# Patient Record
Sex: FEMALE | HISPANIC OR LATINO | Employment: FULL TIME | URBAN - METROPOLITAN AREA
[De-identification: names, ages, dates, MRNs, and addresses within clinical notes are randomized per-mention and may not be internally consistent; named-entity substitution may affect disease eponyms.]

---

## 2024-01-02 ENCOUNTER — HOSPITAL ENCOUNTER (EMERGENCY)
Facility: CLINIC | Age: 44
Discharge: HOME | End: 2024-01-02
Attending: EMERGENCY MEDICINE | Admitting: EMERGENCY MEDICINE
Payer: COMMERCIAL

## 2024-01-02 VITALS
DIASTOLIC BLOOD PRESSURE: 82 MMHG | SYSTOLIC BLOOD PRESSURE: 125 MMHG | OXYGEN SATURATION: 97 % | RESPIRATION RATE: 16 BRPM | HEART RATE: 108 BPM | TEMPERATURE: 98.9 F

## 2024-01-02 DIAGNOSIS — J10.1 INFLUENZA A: ICD-10-CM

## 2024-01-02 LAB
FLUAV RNA SPEC QL NAA+PROBE: POSITIVE
FLUBV RNA RESP QL NAA+PROBE: NEGATIVE
RSV RNA SPEC NAA+PROBE: NEGATIVE
SARS-COV-2 RNA RESP QL NAA+PROBE: NEGATIVE

## 2024-01-02 PROCEDURE — 87637 SARSCOV2&INF A&B&RSV AMP PRB: CPT | Performed by: EMERGENCY MEDICINE

## 2024-01-02 PROCEDURE — 99283 EMERGENCY DEPT VISIT LOW MDM: CPT

## 2024-01-02 PROCEDURE — 250N000013 HC RX MED GY IP 250 OP 250 PS 637: Performed by: EMERGENCY MEDICINE

## 2024-01-02 RX ORDER — ACETAMINOPHEN 500 MG
1000 TABLET ORAL ONCE
Status: COMPLETED | OUTPATIENT
Start: 2024-01-02 | End: 2024-01-02

## 2024-01-02 RX ADMIN — ACETAMINOPHEN 1000 MG: 500 TABLET, FILM COATED ORAL at 18:10

## 2024-01-02 ASSESSMENT — ACTIVITIES OF DAILY LIVING (ADL): ADLS_ACUITY_SCORE: 35

## 2024-01-02 NOTE — Clinical Note
[place] Meyer was seen and treated in our emergency department on 1/2/2024.  She may return to work on 01/08/2024.  Ms. Terry Meyer was seen in our emergency department.  She has been diagnosed with influenza, highly contagious virus.  I recommend she avoid work for the next 4 to 5 days until her fevers juan and she feels well enough to perform her work duties.     If you have any questions or concerns, please don't hesitate to call.      Trierweiler, Chad A, MD

## 2024-01-03 NOTE — ED NOTES
PIT/Triage Evaluation    Patient presented with concern of a cough and fever and bodyaches that started yesterday.  Last took 600 mg of ibuprofen at 1 PM today.    Exam is notable for:  Patient Vitals for the past 24 hrs:   BP Temp Temp src Pulse Resp SpO2   01/02/24 1803 (!) 153/93 (!) 101.3  F (38.5  C) Temporal (!) 124 16 98 %     General:  Sitting on bed, comfortable appearing.   HENT:  No obvious trauma to head  Right Ear:  External ear normal.   Left Ear:  External ear normal.   Nose:  Nose normal.   Eyes:  Conjunctivae and EOM are normal.  Neck: Normal range of motion. Neck supple. No tracheal deviation present.   Pulm/Chest: No respiratory distress  M/S: Normal range of motion.   Neuro: Alert. GCS 15.  Skin: Skin is warm and dry. No rash noted. Not diaphoretic.   Psych: Normal mood and affect. Behavior is normal.     Appropriate interventions for symptom management were initiated if applicable.  Appropriate diagnostic tests were initiated if indicated.    Important information for subsequent clinician:  Florida Meyer is a very pleasant 43 year old year old patient who presents to the emergency department with concern of a cough, myalgias and fever.  Influenza swab sent.  Tylenol provided along with a tall glass of water given the tachycardia    I briefly evaluated the patient and developed an initial plan of care. I discussed this plan and explained that this brief interaction does not constitute a full evaluation. Patient/family understands that they should wait to be fully evaluated and discuss any test results with another clinician prior to leaving the hospital.       Rod Greenwood,   01/02/24 [id number]

## 2024-01-03 NOTE — ED TRIAGE NOTES
PT STARTED TO FEEL FEVERISH SINCE LAST NIGHT , HAS TAKEN 927 TYLENOL AROUND 1PM TODAY .   PT HAS TEMP ,3 IN TRIAGE. USED ONE OF OUR edt  FOR [employer].       Triage Assessment (Adult)       Row Name 01/02/24 0234          Triage Assessment    Airway WDL WDL        Respiratory WDL    Respiratory WDL WDL        Skin Circulation/Temperature WDL    Skin Circulation/Temperature WDL WDL        Cardiac WDL    Cardiac WDL X;rhythm     Pulse Rate & Regularity tachycardic        Peripheral/Neurovascular WDL    Peripheral Neurovascular WDL WDL        Cognitive/Neuro/Behavioral WDL    Cognitive/Neuro/Behavioral WDL WDL

## 2024-01-03 NOTE — ED PROVIDER NOTES
History     Chief Complaint:  Chest Pain, Shortness of Breath, and Fever     The history is provided by the patient. A  was used.      Florida Meyer is a 43 year old female presenting with approximately 18 hours of cough, congestion, fever, and bodyaches.  She is not entirely clear who got her sick because she denies any family members that are sick with similar symptoms.  With this, she presents to us to make sure she does not have something more serious.      Independent Historian:   None - Patient Only    Review of External Notes:   None     Medications:    IUD  Mobic    Past Medical History:    Latent TB    Past Surgical History:     x2    Physical Exam   Patient Vitals for the past 24 hrs:   BP Temp Temp src Pulse Resp SpO2   24 1938 125/82 98.9  F (37.2  C) Temporal 108 16 97 %   24 1803 (!) 153/93 (!) 101.3  F (38.5  C) Temporal (!) 124 16 98 %      Physical Exam  Eye:  Pupils are equal, round, and reactive.  Extraocular movements intact.    ENT:  Tympanic membranes are normal bilaterally.  + nasal congestion.  Moist mucus membranes.  Normal tongue and tonsil.    Cardiac:  Regular rate and rhythm.  No murmurs, gallops, or rubs.    Pulmonary:  Clear to auscultation bilaterally.  No wheezes, rales, or rhonchi.  Infrequent dry cough without increased work of breathing.    Abdomen:  Positive bowel sounds.  Abdomen is soft and non-distended, without focal tenderness.    Musculoskeletal:  Normal movement of all extremities without evidence for deficit.    Skin:  Warm and dry without rashes.    Neurologic:  Non-focal exam without asymmetric weakness or numbness.    Psychiatric:  Normal affect with appropriate interaction.      Emergency Department Course     Laboratory:  Labs Ordered and Resulted from Time of ED Arrival to Time of ED Departure   INFLUENZA A/B, RSV, & SARS-COV2 PCR - Abnormal       Result Value    Influenza A PCR Positive (*)     Influenza B PCR  Negative      RSV PCR Negative      SARS CoV2 PCR Negative       Procedures   None    Emergency Department Course & Assessments:    Interventions:  Medications   acetaminophen (TYLENOL) tablet 1,000 mg (1,000 mg Oral $Given 1/2/24 1810)     Assessments:  1930 I obtained history and examined the patient as noted above. Findings and plan explained to the Patient. Patient discharged home with instructions regarding supportive care, medications, and reasons to return. The importance of close follow-up was reviewed.     Social Determinants of Health affecting care:   None    Disposition:  The patient was discharged to home.     Impression & Plan    CMS Diagnoses: None    Medical Decision Making:  This healthy 43-year-old presents to us with classic symptoms of influenza.  This is confirmed on her swab today.  The remainder of her physical exam is reassuring and she feels better after receiving Tylenol from the physician in triage.  She is otherwise young and healthy and I do not feel that she would require Tamiflu.  She was given a note for work.  She was educated about influenza.  Questions were answered and she is comfort with the plan for discharge.    Diagnosis:    ICD-10-CM    1. Influenza A  J10.1           Discharge Medications:  There are no discharge medications for this patient.       Chad A. Trierweiler, MD  1/2/2024   Trierweiler, Chad A, MD Trierweiler, Chad A, MD  01/02/24 6805